# Patient Record
Sex: MALE | Race: WHITE | NOT HISPANIC OR LATINO
[De-identification: names, ages, dates, MRNs, and addresses within clinical notes are randomized per-mention and may not be internally consistent; named-entity substitution may affect disease eponyms.]

---

## 2023-06-19 PROBLEM — Z00.00 ENCOUNTER FOR PREVENTIVE HEALTH EXAMINATION: Status: ACTIVE | Noted: 2023-06-19

## 2023-06-22 ENCOUNTER — NON-APPOINTMENT (OUTPATIENT)
Age: 73
End: 2023-06-22

## 2023-06-23 ENCOUNTER — APPOINTMENT (OUTPATIENT)
Dept: OTOLARYNGOLOGY | Facility: CLINIC | Age: 73
End: 2023-06-23
Payer: COMMERCIAL

## 2023-06-23 ENCOUNTER — APPOINTMENT (OUTPATIENT)
Dept: OTOLARYNGOLOGY | Facility: CLINIC | Age: 73
End: 2023-06-23
Payer: SELF-PAY

## 2023-06-23 VITALS
OXYGEN SATURATION: 99 % | HEIGHT: 70 IN | BODY MASS INDEX: 26.48 KG/M2 | HEART RATE: 67 BPM | TEMPERATURE: 97.6 F | DIASTOLIC BLOOD PRESSURE: 91 MMHG | WEIGHT: 185 LBS | SYSTOLIC BLOOD PRESSURE: 162 MMHG

## 2023-06-23 DIAGNOSIS — Z78.9 OTHER SPECIFIED HEALTH STATUS: ICD-10-CM

## 2023-06-23 DIAGNOSIS — Z82.49 FAMILY HISTORY OF ISCHEMIC HEART DISEASE AND OTHER DISEASES OF THE CIRCULATORY SYSTEM: ICD-10-CM

## 2023-06-23 DIAGNOSIS — Z82.2 FAMILY HISTORY OF DEAFNESS AND HEARING LOSS: ICD-10-CM

## 2023-06-23 DIAGNOSIS — H69.80 OTHER SPECIFIED DISORDERS OF EUSTACHIAN TUBE, UNSPECIFIED EAR: ICD-10-CM

## 2023-06-23 DIAGNOSIS — J30.1 ALLERGIC RHINITIS DUE TO POLLEN: ICD-10-CM

## 2023-06-23 DIAGNOSIS — Z86.79 PERSONAL HISTORY OF OTHER DISEASES OF THE CIRCULATORY SYSTEM: ICD-10-CM

## 2023-06-23 DIAGNOSIS — H90.A32 MIXED CONDUCTIVE AND SENSORINEURAL HEARING, UNILATERAL, LEFT EAR WITH RESTRICTED HEARING ON THE  CONTRALATERAL SIDE: ICD-10-CM

## 2023-06-23 DIAGNOSIS — H61.21 IMPACTED CERUMEN, RIGHT EAR: ICD-10-CM

## 2023-06-23 PROCEDURE — 99203 OFFICE O/P NEW LOW 30 MIN: CPT | Mod: 25

## 2023-06-23 PROCEDURE — 92557 COMPREHENSIVE HEARING TEST: CPT

## 2023-06-23 PROCEDURE — 31231 NASAL ENDOSCOPY DX: CPT

## 2023-06-23 PROCEDURE — 92550 TYMPANOMETRY & REFLEX THRESH: CPT | Mod: 52

## 2023-06-23 RX ORDER — FLUTICASONE PROPIONATE 50 UG/1
50 SPRAY, METERED NASAL DAILY
Qty: 3 | Refills: 0 | Status: ACTIVE | COMMUNITY
Start: 2023-06-23 | End: 1900-01-01

## 2023-06-23 RX ORDER — MONTELUKAST 10 MG/1
10 TABLET, FILM COATED ORAL
Qty: 90 | Refills: 1 | Status: ACTIVE | COMMUNITY
Start: 2023-06-23 | End: 1900-01-01

## 2023-06-24 NOTE — PHYSICAL EXAM
[Nasal Endoscopy Performed] : nasal endoscopy was performed, see procedure section for findings [Midline] : trachea located in midline position [Normal] : no nystagmus [de-identified] : l nl, r scant cerumen removed atraumatically, b TMs nl  [de-identified] : gait steady

## 2023-06-24 NOTE — HISTORY OF PRESENT ILLNESS
[de-identified] : 72 y/o M is presenting with b hearing loss for the past month. He had a virus around [place] Day. He had a fever, tmax of 101, sinus congestion, and headache and was treated with abx (he does not remember which one) and it resolved. After virus he felt his l hearing was down completely, and right side was decreased. He saw his internist Dr. Mauricio and was found to have cerumen. He had cerumen removed last week which helped somewhat, but he feels his hearing is still decrreased. He was on an airplane this week and noticed a slight improvement in his hearing, but feels both sides are overall decreased. Prior to this event he has had gradual hearing loss for the past 2 years. He wears hearing aids. He is c/o chronic postnasal drip and increased sneezing.  He has had 2 back surgeries and after the first one he had CSF leak and blood patch procedure. He had another back surgery for sciatica and is going for physical therapy. He also has significant cardiac history and had 3 stents placed in September. No FH or SH pertinent to cc. Nonsmoker.

## 2023-06-24 NOTE — PROCEDURE
[Cerumen Impaction] : Cerumen Impaction [Same] : same as the Pre Op Dx. [] : Removal of Cerumen [Anterior rhinoscopy insufficient to account for symptoms] : anterior rhinoscopy insufficient to account for symptoms [None] : none [Flexible Endoscope] : examined with the flexible endoscope [Normal] : the paranasal sinuses had no abnormalities [Allergic] : allergic signs [Serial Number: ___] : Serial Number: [unfilled] [FreeTextEntry6] : done for negative mep to r/o npx mass, check omu patency \par nose appears allergic  [FreeTextEntry5] : l nl, r scant cerumen removed atraumatically, b TMs nl

## 2023-06-24 NOTE — DATA REVIEWED
[de-identified] : outside  from 21 revealed r asymmetric snhl, b nl tympanograms -results reviewed with pt \par today's  revealed r hf snhl, l mixed hearing loss; b type c tympanograms -results reviewed with pt

## 2023-06-24 NOTE — ASSESSMENT
[FreeTextEntry1] : 1. r cerumen impaction \par -cerumen removed\par 2. AR, etd likely fron recent uri, r hf snhl, l mixed hearing loss \par -avoid allergens\par -Flonase, pt denies h/o glaucoma\par -Singulair, confirmed no anxiety/ depression \par -try to auto insufflate ear TID - he said he could not at present\par -outside  from 21 revealed r asymmetric snhl, b nl tympanograms -results reviewed with pt \par -today's  revealed r hf snhl, l mixed hearing loss; b type c tympanograms -results reviewed with pt \par -he wears hearing aids- brought to audiology to consider getting new hearing aids also rec to go back to [facility] to get present HA adjusted\par RTC in 1 month to recheck ears; asked to call sooner for any issues

## 2023-08-11 ENCOUNTER — APPOINTMENT (OUTPATIENT)
Dept: OTOLARYNGOLOGY | Facility: CLINIC | Age: 73
End: 2023-08-11